# Patient Record
Sex: FEMALE | Race: WHITE | NOT HISPANIC OR LATINO | URBAN - NONMETROPOLITAN AREA
[De-identification: names, ages, dates, MRNs, and addresses within clinical notes are randomized per-mention and may not be internally consistent; named-entity substitution may affect disease eponyms.]

---

## 2021-04-05 ENCOUNTER — IMPORTED ENCOUNTER (OUTPATIENT)
Dept: URBAN - NONMETROPOLITAN AREA CLINIC 1 | Facility: CLINIC | Age: 66
End: 2021-04-05

## 2021-04-05 PROBLEM — H43.811: Noted: 2021-04-05

## 2021-04-05 PROBLEM — H16.223: Noted: 2021-04-05

## 2021-04-05 PROBLEM — H25.813: Noted: 2021-04-05

## 2021-04-05 PROBLEM — E11.9: Noted: 2021-04-05

## 2021-04-05 PROCEDURE — 92012 INTRM OPH EXAM EST PATIENT: CPT

## 2021-04-05 NOTE — PATIENT DISCUSSION
PVD OD-  discussed findings w/patient-  Retina flat 360 with no breaks tears or heme. -  S&S of RD/RT reviewed with pt. -  Stressed that pt should contact our office right away with any changes or increase in symptoms.-  Continue to monitor at 1 week f/u or prn; 's Notes: MRDFE 4/5/2021

## 2021-04-12 ENCOUNTER — IMPORTED ENCOUNTER (OUTPATIENT)
Dept: URBAN - NONMETROPOLITAN AREA CLINIC 1 | Facility: CLINIC | Age: 66
End: 2021-04-12

## 2021-04-12 PROCEDURE — 99213 OFFICE O/P EST LOW 20 MIN: CPT

## 2021-04-12 NOTE — PATIENT DISCUSSION
ELIZABETH OD-  discussed findings w/patient-  Retina flat 360 with no breaks tears or heme. -  S&S of RD/RT reviewed with pt. -  Stressed that pt should contact our office right away with any changes or increase in symptoms. -  patient is to continue to limit bending at the waist no lifting anything over 5lbs or no streneous activity until advised by doctor-  Continue to monitor at 2 week f/u or prn; 's Notes: MRDFE 4/12/2021 OD Only

## 2021-04-26 ENCOUNTER — IMPORTED ENCOUNTER (OUTPATIENT)
Dept: URBAN - NONMETROPOLITAN AREA CLINIC 1 | Facility: CLINIC | Age: 66
End: 2021-04-26

## 2021-04-26 PROCEDURE — 99213 OFFICE O/P EST LOW 20 MIN: CPT

## 2021-04-26 NOTE — PATIENT DISCUSSION
PVD OD-  discussed findings w/patient-  Retina flat 360 with no breaks tears or heme. -  S&S of RD/RT reviewed with pt. -  Stressed that pt should contact our office right away with any changes or increase in symptoms.-  All restrictions have been lifted at this time-  Continue to monitor w/ 6 week f/u; 's Notes: MRDFE 4/26/2021 OD Only

## 2021-06-07 ENCOUNTER — IMPORTED ENCOUNTER (OUTPATIENT)
Dept: URBAN - NONMETROPOLITAN AREA CLINIC 1 | Facility: CLINIC | Age: 66
End: 2021-06-07

## 2021-06-07 PROCEDURE — 99213 OFFICE O/P EST LOW 20 MIN: CPT

## 2021-06-07 NOTE — PATIENT DISCUSSION
PVD OD-  discussed findings w/patient-  Retina flat 360 with no breaks tears or heme. -  S&S of RD/RT reviewed with pt. -  Stressed that pt should contact our office right away with any changes or increase in symptoms. -  patient needs cat eval; 's Notes: MRDFE 6/7/2021 OD Only

## 2021-07-27 ENCOUNTER — IMPORTED ENCOUNTER (OUTPATIENT)
Dept: URBAN - NONMETROPOLITAN AREA CLINIC 1 | Facility: CLINIC | Age: 66
End: 2021-07-27

## 2021-07-27 PROCEDURE — 92014 COMPRE OPH EXAM EST PT 1/>: CPT

## 2021-07-27 NOTE — PATIENT DISCUSSION
Cataract(s)-Visually significant cataract OU .-Cataract(s) causing symptomatic impairment of visual function not correctable with a tolerable change in glasses or contact lenses lighting or non-operative means resulting in specific activity limitations and/or participation restrictions including but not limited to reading viewing television driving or meeting vocational or recreational needs. -Expectation is clearer vision and functional improvement in symptoms as well as reduced glare disability after cataract removal.-Order IOLMaster and OPD today. -Recommend Stand/Trad  based on today's OPD testing and lifestyle questionnaire.-All questions were answered regarding surgery including pre and post-op medications appointments activity restrictions and anesthetic usage.-The risks benefits and alternatives and special risk factors for the patient were discussed in detail including but not limited to: bleeding infection retinal detachment vitreous loss problems with the implant and possible need for additional surgery.-Although rare the possibility of complete vision loss was discussed.-The possible need for glasses post-operatively was discussed.-Order medical clearance exam based on history of diabetes-Patient elects to proceed with cataract surgery OD. Will schedule at patient's convenience and re-evaluate OS  in the future. Discussed all lens and patient elects Stand/Trad OU and discussed need for bifocals s/p surgery. Vireous Heme ODnoted on todays exam. Hazy view of retina 2nd to dense cataract.  Recommend patient have CE first then have evaluation with Dr. Padmaja Rodriguez s/p and patient agrees with plan.; 's Notes: MRDFE 6/7/2021 OD Only

## 2021-07-28 PROBLEM — H25.813: Noted: 2021-07-27

## 2021-07-28 PROBLEM — H16.223: Noted: 2021-07-28

## 2021-07-28 PROBLEM — H43.11: Noted: 2021-07-28

## 2021-07-28 PROBLEM — E11.9: Noted: 2021-04-05

## 2021-07-28 PROBLEM — H43.811: Noted: 2021-07-27

## 2021-09-01 ENCOUNTER — IMPORTED ENCOUNTER (OUTPATIENT)
Dept: URBAN - NONMETROPOLITAN AREA CLINIC 1 | Facility: CLINIC | Age: 66
End: 2021-09-01

## 2021-09-01 PROBLEM — H43.811: Noted: 2021-09-01

## 2021-09-01 PROBLEM — E11.9: Noted: 2021-09-01

## 2021-09-01 PROBLEM — H43.813: Noted: 2021-09-01

## 2021-09-01 PROBLEM — H43.11: Noted: 2021-09-01

## 2021-09-01 PROBLEM — H25.813: Noted: 2021-09-01

## 2022-03-18 PROBLEM — L03.311 ABDOMINAL WALL CELLULITIS: Status: ACTIVE | Noted: 2021-11-24

## 2022-03-19 PROBLEM — E11.9 DIABETES MELLITUS (HCC): Status: ACTIVE | Noted: 2021-11-24

## 2022-03-19 PROBLEM — K42.0 UMBILICAL HERNIA, INCARCERATED: Status: ACTIVE | Noted: 2021-11-24

## 2022-04-15 ASSESSMENT — TONOMETRY
OS_IOP_MMHG: 19
OS_IOP_MMHG: 19
OD_IOP_MMHG: 20
OD_IOP_MMHG: 19
OD_IOP_MMHG: 21
OS_IOP_MMHG: 20
OS_IOP_MMHG: 20
OS_IOP_MMHG: 21
OD_IOP_MMHG: 19
OD_IOP_MMHG: 22

## 2022-04-15 ASSESSMENT — VISUAL ACUITY
OS_PH: 20/30-2
OS_GLARE: 20/400
OS_SC: 20/30
OU_SC: 20/30
OS_SC: 20/25-1
OD_SC: 20/40
OS_AM: 20/20
OD_PH: 20/30-2
OS_SC: 20/40
OU_CC: 20/30
OU_SC: 20/30
OD_GLARE: CF 4'
OD_SC: 20/40
OS_SC: 20/40
OU_CC: 20/30
OU_CC: 20/20
OD_SC: 20/50
OD_PAM: 20/20
OD_SC: 20/30
OD_SC: 20/60
OS_SC: 20/30-1

## 2023-08-24 ENCOUNTER — FOLLOW UP (OUTPATIENT)
Dept: URBAN - NONMETROPOLITAN AREA CLINIC 4 | Facility: CLINIC | Age: 68
End: 2023-08-24

## 2023-08-24 DIAGNOSIS — H25.813: ICD-10-CM

## 2023-08-24 DIAGNOSIS — H43.813: ICD-10-CM

## 2023-08-24 DIAGNOSIS — E11.9: ICD-10-CM

## 2023-08-24 PROCEDURE — 92014 COMPRE OPH EXAM EST PT 1/>: CPT

## 2023-08-24 ASSESSMENT — VISUAL ACUITY
OD_CC: 20/200
OS_CC: 20/200

## 2023-08-24 ASSESSMENT — TONOMETRY
OD_IOP_MMHG: 14
OS_IOP_MMHG: 14